# Patient Record
Sex: MALE | Race: BLACK OR AFRICAN AMERICAN | NOT HISPANIC OR LATINO | URBAN - METROPOLITAN AREA
[De-identification: names, ages, dates, MRNs, and addresses within clinical notes are randomized per-mention and may not be internally consistent; named-entity substitution may affect disease eponyms.]

---

## 2024-07-24 ENCOUNTER — TELEPHONE (OUTPATIENT)
Dept: HEMATOLOGY/ONCOLOGY | Facility: CLINIC | Age: 64
End: 2024-07-24
Payer: COMMERCIAL

## 2024-07-24 NOTE — NURSING
Nurse navigator spoke with patient who wanted a 2nd opinion for his prostate cancer care.  Referral by BILLIE Mukherjee with [facility].  Made appt for patient to see Dr. Brito on 7/25/24.  All questions answered and contact info given to patient for any further questions.

## 2024-07-25 ENCOUNTER — TELEPHONE (OUTPATIENT)
Dept: UROLOGY | Facility: CLINIC | Age: 64
End: 2024-07-25

## 2024-07-25 ENCOUNTER — OFFICE VISIT (OUTPATIENT)
Dept: UROLOGY | Facility: CLINIC | Age: 64
End: 2024-07-25
Payer: COMMERCIAL

## 2024-07-25 VITALS
HEART RATE: 102 BPM | BODY MASS INDEX: 24.24 KG/M2 | HEIGHT: 65 IN | DIASTOLIC BLOOD PRESSURE: 87 MMHG | WEIGHT: 145.5 LBS | SYSTOLIC BLOOD PRESSURE: 148 MMHG

## 2024-07-25 DIAGNOSIS — C61 PROSTATE CANCER: ICD-10-CM

## 2024-07-25 DIAGNOSIS — Z87.898 HISTORY OF ELEVATED PSA: Primary | ICD-10-CM

## 2024-07-25 PROCEDURE — 99999 PR PBB SHADOW E&M-EST. PATIENT-LVL III: CPT | Mod: PBBFAC,,, | Performed by: STUDENT IN AN ORGANIZED HEALTH CARE EDUCATION/TRAINING PROGRAM

## 2024-07-25 RX ORDER — NIFEDIPINE 60 MG/1
1 TABLET, EXTENDED RELEASE ORAL DAILY
COMMUNITY
Start: 2024-06-17

## 2024-07-25 RX ORDER — ATORVASTATIN CALCIUM 20 MG/1
1 TABLET, FILM COATED ORAL DAILY
COMMUNITY
Start: 2024-06-17

## 2024-07-25 NOTE — PROGRESS NOTES
"[facility]  Urologic Oncology Clinic Note        Date of Service: 7/25/2024      Chief Complaint/Reason for Consultation: Prostate cancer, Very High Risk     Requesting Provider:   Self, Aaareferral  No address on file      History of Present Illness:   Patient 64 y.o. male presents with prostate cancer. Here for second opinion from [facility] [place]. He is deemed to have very high risk prostate cancer due to 2 high risk features including PSA > 20 and cT3a vs cT3b disease by MRI.       No hx of MI or CVA  No blood thinners  No abdomina surgery       Urologic History:    2/29/2024 - PSA 47.5  4/22/2024 - prostate MRI: 40cc gland, diffuse PIRADS 4 and 5 lesions throughout, disease near left NVB, possible left SV invasion  5/8/2024 - UroNav biopsy (transrectal OSH): Diffuse 3+3 and 3+4 disease: ANJUM#1 3+4, ANJUM#2 3+3, ANJUM#3 3+4, RLB 3+4, RLM 3+3, R apex 3+4, R Tz 3+4  5/27/2024 - PSMA PET showing localized disease, no evidence of metastatic disease  7/25/2024 - AUASS - 157599, nocturia x4, qol 2, DULCE - 08481    Patient Active Problem List    Diagnosis Date Noted    Hypercholesteremia 07/26/2016    Foreskin swelling 04/26/2016    Body mass index (BMI) 24.0-24.9, adult 09/25/2015    History of elevated PSA 09/25/2015    Hypertensive retinopathy 06/09/2015    Essential hypertension, benign 12/20/2012          Review of patient's allergies indicates:  No Known Allergies     Past Medical History:   Diagnosis Date    Bronchitis     Dyslipidemia     Eczema     Essential hypertension, benign       No past surgical history on file.   Family History   Problem Relation Name Age of Onset    Hypertension Father        Social History     Tobacco Use    Smoking status: Never    Smokeless tobacco: Never   Substance Use Topics    Alcohol use: Yes     Comment: occasionally         OBJECTIVE:     Vitals:    07/25/24 0847   BP: (!) 148/87   Pulse: 102   Weight: 66 kg (145 lb 8.1 oz)   Height: 5' 5" (1.651 m)          Physical " Exam  Constitutional:       General: He is not in acute distress.     Appearance: Normal appearance.   HENT:      Head: Normocephalic and atraumatic.      Nose: Nose normal.   Eyes:      Conjunctiva/sclera: Conjunctivae normal.   Cardiovascular:      Rate and Rhythm: Normal rate.   Pulmonary:      Effort: Pulmonary effort is normal. No respiratory distress.   Abdominal:      General: There is no distension.   Musculoskeletal:         General: No deformity.   Skin:     General: Skin is warm and dry.   Neurological:      General: No focal deficit present.      Mental Status: He is alert.   Psychiatric:         Mood and Affect: Mood normal.         Behavior: Behavior normal.           LAB:      Outside labs reviewed      IMAGING:                ASSESSMENT/PLAN:       65 yo M here with new diagnosis of high risk prostate cancer here for further evaluation and management.      Plan: Will obtain PSA today. Patient currently interested in RALP, not interested in radiation. We need to see the prostate MRI ourselves so planning for patient to obtain disk of imaging and return to clinic with it.       Prostate cancer, Very High risk   The treatment options for prostate cancer include but are not limited to expectant management, active surveillance, hormonal therapy (medical or surgical castration), interstitial radioactive seeds (brachytherapy), external beam radiotherapy, cryosurgery, HIFU, chemotherapy or radical prostatectomy.  The risks, benefits, possible complications and alternatives of each were discussed, compared and contrasted with one another.  Long-term obstructive or irritative urinary problems occur in a subset of patients following observation, active surveillance or radiation.  Temporary proctitis following radiation persists in some patients long-term in a small but significant subset and is rare during observation, active surveillance or after prostatectomy.  Regarding radiation therapy, brachytherapy has  similar effects as external beam radiotherapy with regard to erectile dysfunction and proctitis but can also exacerbate urinary obstructive symptoms.  Proton beam therapy offers no clinical advantage over other forms of definitive treatment. Erectile dysfunction occurs in many patients after radical prostatectomy or radiation, and that ejaculation will be lacking despite preserved ability to attain orgasm, whereas observation does not cause such sexual dysfunction, however sexual dysfunction can still decline over time in some men without prostate cancer treatment.  Whole gland cryotherapy is associated with worse sexual side effects and similar urinary side effects as those after radiotherapy. Temporary urinary incontinence occurs in most patients after prostatectomy and persists long-term in a small but significant subset, more than during observation, active surveillance or after radiation.         I told the patient that based upon his pre-treatment parameters he has NCCN very high risk prostate cancer.  Patients who have very high risk prostate cancer often tend to have an increased chance of extracapsular disease, seminal vesicle or lymph node involvement and/or positive margins if surgery is elected. Radical prostatectomy or radiotherapy plus androgen deprivation therapy (ADT) are the standard treatment options for patients with high risk, localized prostate cancer. The use of ADT with radiation increases the likelihood and severity of adverse treatment-related events on sexual function in most men and can cause other systemic side effects. ADT is commonly given for 24-36 months. For high risk disease, often a multimodal approach is utilized whereby various treatments are combined to potentially achieve improved cancer control. Examples of this would include surgery plus radiation, hormones plus radiation or even clinical trials among other options. There is an increased risk of inability to cure the cancer  in which case we would focus on various methods of cancer control while attempting to minimize morbidity.  The patient understands that men having D'Amico high risk disease are at increased risk for prostate cancer recurrence, progression, and requiring additional or salvage therapies.  These clinical scenarios were discussed and the patient had the opportunity to ask questions. Staging scans are recommended, such as a MRI or CT with contrast, and a bone scan. Thus far the patient has had an outside prostate MRI with likely EPE and possible SV invasion and a PSMA PET negative for metastatic disease.       Ultimately, selection of a management strategy for localized prostate cancer should incorporate shared decision-making along with cancer severity (high risk category as discussed), patient values and preferences, life expectancy [also encouraged to seek advice from internist/primary care], pre-treatment general functional and genitourinary symptoms [documented above], expected post-treatment functional status and potential for salvage treatment.      Today we have tried to place a referral for him to our radiation and medical oncology colleagues, but patient declined.         Patients can become proactively involved with their care via modifiable health-related behaviors and/or risk factors.  Smoking and obesity may adversely impact treatment outcomes and increase the risk of general surgical complications in those men electing therapy for prostate cancer.  Smoking in general is associated with poor erectile function and urinary problems.  Thus, exercise, a balanced wholesome diet and abstinence from these and other negative modifiable health-related behaviors is recommended.      We then discussed the risks and benefits of radical prostatectomy and pelvic lymphadenectomy. Prostatectomy can relieve preoperative urinary obstructive symptoms.  Pelvic lymph node dissection is recommended for those with unfavorable  intermediate risk or high risk disease. We discussed both immediate and long-term side effects of surgery.  The potential risks include but are not limited to bleeding/transfusion, infection, reaction to anesthesia, heart attack, deep venous thrombosis/pulmonary embolus, death or other adverse medical outcome, anastomotic stricture, incontinence, impotence, rectal injury or fistula with need for colostomy/additional surgery, injury to surrounding structures (nerves, vessels, ureter, bowel/bowel obstruction, etc.), failure to completely eradicate the tumor/positive margins or the need for further therapy.  I told him that after surgery, his libido should be unchanged, he may or may not have normal spontaneous erections but that he would likely have a dry orgasm.  I told him that additional procedures may be necessary to address strictures, incontinence, impotence, rectal fistula, drainage of a lymphocele, etc. Younger (<65 years of age) or healthier (>10 year life expectancy) men are more likely to experience cancer control benefits from prostatectomy than older men.  Older men experience higher rates of permanent erectile dysfunction and urinary incontinence after prostatectomy compared to younger men. For men who are found to have locally extensive cancer with prostatectomy, additional radiation therapy may be recommended.  I gave him the option of discussing radiation treatment options with our radiation oncologist or cancer therapies with our genitourinary oncologist.  He had the opportunity to ask questions and his questions were answered to his satisfaction.  I thought he would be a good candidate for RALP     We also spoke about the usual preoperative care, the bowel prep, the usual course of hospitalization and postoperative care.  If he decides to undergo surgery, most men will need to abstain from NSAIDs and blood thinners usually two weeks prior to surgery but that also depends on the medication and the  particular clinical situation.  I told him that if he has any concerns about temporarily discontinuing his anticoagulants, he will need to take the initiative to discuss this with his internist, cardiologist or prescriber of those medications/anticoagulants.       We did encourage the patient to obtain an independent opinion from each of medical oncology and radiation oncology, and did offer to place a referral.      Ultimately, we did inform the patient that selection of a management strategy for localized prostate cancer should incorporate shared decision-making along with cancer severity (high risk category as discussed), patient values and preferences, life expectancy [also encouraged to seek advice from internist/primary care], pre-treatment general functional and genitourinary symptoms [documented above], expected post-treatment functional status and potential for salvage treatment.      We discussed with patient that he has very high risk and that if he chose surgery as primary treatment option this would likely be a part of a multimodal treatment pathway that would include need for salvage radiation and even ADT. We discussed that we need the images from his MRI to determine next steps better as we informed him that surgery would not be a good option for primary treatment unless we felt that a negative margin was possible at the time of surgery.     Will see patient back when he is able to obtain the disc of his MRI.         Cruz Brito MD  Urologic Oncology  P: [phone]

## 2024-07-25 NOTE — TELEPHONE ENCOUNTER
Spoke with patient who was able to provide acceptable patient identifiers prior to start of conversation.   Patient will have lab drawn at [facility] 7/26.  Appt scheduled with patient.

## 2024-07-26 ENCOUNTER — LAB VISIT (OUTPATIENT)
Dept: LAB | Facility: HOSPITAL | Age: 64
End: 2024-07-26
Payer: COMMERCIAL

## 2024-07-26 DIAGNOSIS — Z87.898 HISTORY OF ELEVATED PSA: ICD-10-CM

## 2024-07-26 LAB — COMPLEXED PSA SERPL-MCNC: 45.1 NG/ML (ref 0–4)

## 2024-07-26 PROCEDURE — 84153 ASSAY OF PSA TOTAL: CPT

## 2024-07-26 PROCEDURE — 36415 COLL VENOUS BLD VENIPUNCTURE: CPT

## 2024-07-30 DIAGNOSIS — C61 PROSTATE CANCER: Primary | ICD-10-CM

## 2024-07-31 DIAGNOSIS — Z87.898 HISTORY OF ELEVATED PSA: Primary | ICD-10-CM

## 2024-08-05 ENCOUNTER — OFFICE VISIT (OUTPATIENT)
Dept: UROLOGY | Facility: CLINIC | Age: 64
End: 2024-08-05
Payer: COMMERCIAL

## 2024-08-05 VITALS
DIASTOLIC BLOOD PRESSURE: 93 MMHG | HEART RATE: 80 BPM | BODY MASS INDEX: 24.12 KG/M2 | WEIGHT: 144.81 LBS | SYSTOLIC BLOOD PRESSURE: 158 MMHG | HEIGHT: 65 IN

## 2024-08-05 DIAGNOSIS — C61 PROSTATE CANCER: Primary | ICD-10-CM

## 2024-08-05 PROCEDURE — 99999 PR PBB SHADOW E&M-EST. PATIENT-LVL III: CPT | Mod: PBBFAC,,, | Performed by: STUDENT IN AN ORGANIZED HEALTH CARE EDUCATION/TRAINING PROGRAM

## 2024-08-05 PROCEDURE — 1159F MED LIST DOCD IN RCRD: CPT | Mod: CPTII,S$GLB,, | Performed by: STUDENT IN AN ORGANIZED HEALTH CARE EDUCATION/TRAINING PROGRAM

## 2024-08-05 PROCEDURE — 3077F SYST BP >= 140 MM HG: CPT | Mod: CPTII,S$GLB,, | Performed by: STUDENT IN AN ORGANIZED HEALTH CARE EDUCATION/TRAINING PROGRAM

## 2024-08-05 PROCEDURE — 3080F DIAST BP >= 90 MM HG: CPT | Mod: CPTII,S$GLB,, | Performed by: STUDENT IN AN ORGANIZED HEALTH CARE EDUCATION/TRAINING PROGRAM

## 2024-08-05 PROCEDURE — 3008F BODY MASS INDEX DOCD: CPT | Mod: CPTII,S$GLB,, | Performed by: STUDENT IN AN ORGANIZED HEALTH CARE EDUCATION/TRAINING PROGRAM

## 2024-08-05 PROCEDURE — 99215 OFFICE O/P EST HI 40 MIN: CPT | Mod: S$GLB,,, | Performed by: STUDENT IN AN ORGANIZED HEALTH CARE EDUCATION/TRAINING PROGRAM

## 2024-08-05 NOTE — PROGRESS NOTES
[facility]  Urologic Oncology Clinic Note        Date of Service: 8/5/2024      Chief Complaint/Reason for Consultation: Prostate cancer, Very High Risk     Requesting Provider:   No referring provider defined for this encounter.      History of Present Illness:   Patient 64 y.o. male presents with prostate cancer. Here for second opinion from [facility] [place]. He is deemed to have very high risk prostate cancer due to 2 high risk features including PSA > 20 and cT3a vs cT3b disease by MRI.       No hx of MI or CVA  No blood thinners  No abdomina surgery     Here to discuss surgery vs radiation vs radiation + ADT.  He returns today with MRI disc in hand which shows both EPE and possible SV invasion of his prostate cancer. We discussed that these findings are not favorable for management with surgery as a first line option. Discussed that he should likely undergo treatment with XRT + ADT.      Urologic History:    2/29/2024 -- PSA 47.5  4/22/2024 -- prostate MRI: 40cc gland, diffuse PIRADS 4 and 5 lesions throughout, disease near left NVB, possible left SV invasion  5/8/2024 -- UroNav biopsy (transrectal OSH): Diffuse 3+3 and 3+4 disease: ANJUM#1 3+4, ANJUM#2 3+3, ANJUM#3 3+4, RLB 3+4, RLM 3+3, R apex 3+4, R Tz 3+4  5/27/2024 -- PSMA PET showing localized disease, no evidence of metastatic disease  7/25/2024 -- AUASS - [id number], nocturia x4, qol 2, DULCE - 41940  7/26/2024 -- PSA 45     Patient Active Problem List    Diagnosis Date Noted    Hypercholesteremia 07/26/2016    Foreskin swelling 04/26/2016    Body mass index (BMI) 24.0-24.9, adult 09/25/2015    History of elevated PSA 09/25/2015    Hypertensive retinopathy 06/09/2015    Essential hypertension, benign 12/20/2012          Review of patient's allergies indicates:  No Known Allergies     Past Medical History:   Diagnosis Date    Bronchitis     Dyslipidemia     Eczema     Essential hypertension, benign       No past surgical history on file.   Family History   Problem  "Relation Name Age of Onset    Hypertension Father        Social History     Tobacco Use    Smoking status: Never    Smokeless tobacco: Never   Substance Use Topics    Alcohol use: Yes     Comment: occasionally         OBJECTIVE:     Vitals:    08/05/24 1414   BP: (!) 158/93   Pulse: 80   Weight: 65.7 kg (144 lb 13.5 oz)   Height: 5' 5" (1.651 m)          Physical Exam  Constitutional:       General: He is not in acute distress.     Appearance: Normal appearance.   HENT:      Head: Normocephalic and atraumatic.      Nose: Nose normal.   Eyes:      Conjunctiva/sclera: Conjunctivae normal.   Cardiovascular:      Rate and Rhythm: Normal rate.   Pulmonary:      Effort: Pulmonary effort is normal. No respiratory distress.   Abdominal:      General: There is no distension.   Musculoskeletal:         General: No deformity.   Skin:     General: Skin is warm and dry.   Neurological:      General: No focal deficit present.      Mental Status: He is alert.   Psychiatric:         Mood and Affect: Mood normal.         Behavior: Behavior normal.           LAB:      Outside labs reviewed      IMAGING:                ASSESSMENT/PLAN:       63 yo M here with new diagnosis of high risk prostate cancer here for further evaluation and management.      Plan: Will obtain PSA today. Patient currently interested in RALP, not interested in radiation. We need to see the prostate MRI ourselves so planning for patient to obtain disk of imaging and return to clinic with it.       Prostate cancer, Very High risk   The treatment options for prostate cancer include but are not limited to expectant management, active surveillance, hormonal therapy (medical or surgical castration), interstitial radioactive seeds (brachytherapy), external beam radiotherapy, cryosurgery, HIFU, chemotherapy or radical prostatectomy.  The risks, benefits, possible complications and alternatives of each were discussed, compared and contrasted with one another.  Long-term " obstructive or irritative urinary problems occur in a subset of patients following observation, active surveillance or radiation.  Temporary proctitis following radiation persists in some patients long-term in a small but significant subset and is rare during observation, active surveillance or after prostatectomy.  Regarding radiation therapy, brachytherapy has similar effects as external beam radiotherapy with regard to erectile dysfunction and proctitis but can also exacerbate urinary obstructive symptoms.  Proton beam therapy offers no clinical advantage over other forms of definitive treatment. Erectile dysfunction occurs in many patients after radical prostatectomy or radiation, and that ejaculation will be lacking despite preserved ability to attain orgasm, whereas observation does not cause such sexual dysfunction, however sexual dysfunction can still decline over time in some men without prostate cancer treatment.  Whole gland cryotherapy is associated with worse sexual side effects and similar urinary side effects as those after radiotherapy. Temporary urinary incontinence occurs in most patients after prostatectomy and persists long-term in a small but significant subset, more than during observation, active surveillance or after radiation.         I told the patient that based upon his pre-treatment parameters he has NCCN very high risk prostate cancer.  Patients who have very high risk prostate cancer often tend to have an increased chance of extracapsular disease, seminal vesicle or lymph node involvement and/or positive margins if surgery is elected. Radical prostatectomy or radiotherapy plus androgen deprivation therapy (ADT) are the standard treatment options for patients with high risk, localized prostate cancer. The use of ADT with radiation increases the likelihood and severity of adverse treatment-related events on sexual function in most men and can cause other systemic side effects. ADT is  commonly given for 24-36 months. For high risk disease, often a multimodal approach is utilized whereby various treatments are combined to potentially achieve improved cancer control. Examples of this would include surgery plus radiation, hormones plus radiation or even clinical trials among other options. There is an increased risk of inability to cure the cancer in which case we would focus on various methods of cancer control while attempting to minimize morbidity.  The patient understands that men having D'Amico high risk disease are at increased risk for prostate cancer recurrence, progression, and requiring additional or salvage therapies.  These clinical scenarios were discussed and the patient had the opportunity to ask questions. Staging scans are recommended, such as a MRI or CT with contrast, and a bone scan. Thus far the patient has had an outside prostate MRI with likely EPE and possible SV invasion and a PSMA PET negative for metastatic disease.       Ultimately, selection of a management strategy for localized prostate cancer should incorporate shared decision-making along with cancer severity (high risk category as discussed), patient values and preferences, life expectancy [also encouraged to seek advice from internist/primary care], pre-treatment general functional and genitourinary symptoms [documented above], expected post-treatment functional status and potential for salvage treatment.      Today we have tried to place a referral for him to our radiation and medical oncology colleagues, but patient declined.         Patients can become proactively involved with their care via modifiable health-related behaviors and/or risk factors.  Smoking and obesity may adversely impact treatment outcomes and increase the risk of general surgical complications in those men electing therapy for prostate cancer.  Smoking in general is associated with poor erectile function and urinary problems.  Thus, exercise,  a balanced wholesome diet and abstinence from these and other negative modifiable health-related behaviors is recommended.      We then discussed the risks and benefits of radical prostatectomy and pelvic lymphadenectomy. Prostatectomy can relieve preoperative urinary obstructive symptoms.  Pelvic lymph node dissection is recommended for those with unfavorable intermediate risk or high risk disease. We discussed both immediate and long-term side effects of surgery.  The potential risks include but are not limited to bleeding/transfusion, infection, reaction to anesthesia, heart attack, deep venous thrombosis/pulmonary embolus, death or other adverse medical outcome, anastomotic stricture, incontinence, impotence, rectal injury or fistula with need for colostomy/additional surgery, injury to surrounding structures (nerves, vessels, ureter, bowel/bowel obstruction, etc.), failure to completely eradicate the tumor/positive margins or the need for further therapy.  I told him that after surgery, his libido should be unchanged, he may or may not have normal spontaneous erections but that he would likely have a dry orgasm.  I told him that additional procedures may be necessary to address strictures, incontinence, impotence, rectal fistula, drainage of a lymphocele, etc. Younger (<65 years of age) or healthier (>10 year life expectancy) men are more likely to experience cancer control benefits from prostatectomy than older men.  Older men experience higher rates of permanent erectile dysfunction and urinary incontinence after prostatectomy compared to younger men. For men who are found to have locally extensive cancer with prostatectomy, additional radiation therapy may be recommended.  I gave him the option of discussing radiation treatment options with our radiation oncologist or cancer therapies with our genitourinary oncologist.  He had the opportunity to ask questions and his questions were answered to his  satisfaction.  I thought he would be a good candidate for RALP     We also spoke about the usual preoperative care, the bowel prep, the usual course of hospitalization and postoperative care.  If he decides to undergo surgery, most men will need to abstain from NSAIDs and blood thinners usually two weeks prior to surgery but that also depends on the medication and the particular clinical situation.  I told him that if he has any concerns about temporarily discontinuing his anticoagulants, he will need to take the initiative to discuss this with his internist, cardiologist or prescriber of those medications/anticoagulants.       We did encourage the patient to obtain an independent opinion from each of medical oncology and radiation oncology, and did offer to place a referral.      Ultimately, we did inform the patient that selection of a management strategy for localized prostate cancer should incorporate shared decision-making along with cancer severity (high risk category as discussed), patient values and preferences, life expectancy [also encouraged to seek advice from internist/primary care], pre-treatment general functional and genitourinary symptoms [documented above], expected post-treatment functional status and potential for salvage treatment.      We discussed with patient that he has very high risk and that if he chose surgery as primary treatment option this would likely be a part of a multimodal treatment pathway that would include need for salvage radiation and even ADT. We discussed that we need the images from his MRI to determine next steps better as we informed him that surgery would not be a good option for primary treatment unless we felt that a negative margin was possible at the time of surgery.     Today I reviewed with the patient his MRI and informed him I do not think he would be a good candidate for RALP + BPLND due to inability to get negative margins.     He plans to return to [facility] [place] and  see his surgeon there for further care.     He was encouraged to reach out to us if he wanted further care.        Cruz Brito MD  Urologic Oncology  P: [phone]

## 2025-07-21 DIAGNOSIS — M79.89 SWELLING OF LOWER EXTREMITY: ICD-10-CM

## 2025-07-21 DIAGNOSIS — I89.0 LYMPHEDEMA OF LEFT LEG: Primary | ICD-10-CM

## 2025-08-13 DIAGNOSIS — M79.89 SWELLING OF LOWER EXTREMITY: ICD-10-CM

## 2025-08-13 DIAGNOSIS — I89.0 LYMPHEDEMA OF LEFT LEG: Primary | ICD-10-CM
